# Patient Record
Sex: MALE | Race: WHITE
[De-identification: names, ages, dates, MRNs, and addresses within clinical notes are randomized per-mention and may not be internally consistent; named-entity substitution may affect disease eponyms.]

---

## 2019-02-16 ENCOUNTER — HOSPITAL ENCOUNTER (OUTPATIENT)
Dept: HOSPITAL 95 - ER | Age: 39
Setting detail: OBSERVATION
LOS: 2 days | Discharge: PSYCHIATRIC OR SUBSTANCE USE FACILITY | End: 2019-02-18
Payer: COMMERCIAL

## 2019-02-16 VITALS — BODY MASS INDEX: 29.62 KG/M2 | HEIGHT: 69 IN | WEIGHT: 200 LBS

## 2019-02-16 DIAGNOSIS — Z87.891: ICD-10-CM

## 2019-02-16 DIAGNOSIS — Z91.030: ICD-10-CM

## 2019-02-16 DIAGNOSIS — F32.9: ICD-10-CM

## 2019-02-16 DIAGNOSIS — T48.1X2A: Primary | ICD-10-CM

## 2019-02-16 DIAGNOSIS — Z79.899: ICD-10-CM

## 2019-02-16 DIAGNOSIS — Z88.1: ICD-10-CM

## 2019-02-16 LAB
ALBUMIN SERPL BCP-MCNC: 4 G/DL (ref 3.4–5)
ALBUMIN/GLOB SERPL: 1.2 {RATIO} (ref 0.8–1.8)
ALT SERPL W P-5'-P-CCNC: 37 U/L (ref 12–78)
ANION GAP SERPL CALCULATED.4IONS-SCNC: 10 MMOL/L (ref 6–16)
APAP SERPL-MCNC: <2 UG/ML (ref 10–30)
AST SERPL W P-5'-P-CCNC: 23 U/L (ref 12–37)
BASOPHILS # BLD AUTO: 0.05 K/MM3 (ref 0–0.23)
BASOPHILS NFR BLD AUTO: 1 % (ref 0–2)
BILIRUB SERPL-MCNC: 0.3 MG/DL (ref 0.1–1)
BUN SERPL-MCNC: 8 MG/DL (ref 8–24)
CALCIUM SERPL-MCNC: 8.1 MG/DL (ref 8.5–10.1)
CANNABINOIDS UR QL: DETECTED
CHLORIDE SERPL-SCNC: 110 MMOL/L (ref 98–108)
CO2 SERPL-SCNC: 22 MMOL/L (ref 21–32)
CREAT SERPL-MCNC: 0.66 MG/DL (ref 0.6–1.2)
DEPRECATED RDW RBC AUTO: 44 FL (ref 35.1–46.3)
EOSINOPHIL # BLD AUTO: 0.12 K/MM3 (ref 0–0.68)
EOSINOPHIL NFR BLD AUTO: 2 % (ref 0–6)
ERYTHROCYTE [DISTWIDTH] IN BLOOD BY AUTOMATED COUNT: 13.5 % (ref 11.7–14.2)
ETHANOL SERPL-MCNC: <3 MG/DL
GLOBULIN SER CALC-MCNC: 3.4 G/DL (ref 2.2–4)
GLUCOSE SERPL-MCNC: 86 MG/DL (ref 70–99)
HCT VFR BLD AUTO: 45.1 % (ref 37–53)
HGB BLD-MCNC: 15 G/DL (ref 13.5–17.5)
IMM GRANULOCYTES # BLD AUTO: 0.03 K/MM3 (ref 0–0.1)
IMM GRANULOCYTES NFR BLD AUTO: 1 % (ref 0–1)
LYMPHOCYTES # BLD AUTO: 0.88 K/MM3 (ref 0.84–5.2)
LYMPHOCYTES NFR BLD AUTO: 15 % (ref 21–46)
MCHC RBC AUTO-ENTMCNC: 33.3 G/DL (ref 31.5–36.5)
MCV RBC AUTO: 88 FL (ref 80–100)
MONOCYTES # BLD AUTO: 0.77 K/MM3 (ref 0.16–1.47)
MONOCYTES NFR BLD AUTO: 13 % (ref 4–13)
NEUTROPHILS # BLD AUTO: 4.1 K/MM3 (ref 1.96–9.15)
NEUTROPHILS NFR BLD AUTO: 69 % (ref 41–73)
NRBC # BLD AUTO: 0 K/MM3 (ref 0–0.02)
NRBC BLD AUTO-RTO: 0 /100 WBC (ref 0–0.2)
PLATELET # BLD AUTO: 256 K/MM3 (ref 150–400)
POTASSIUM SERPL-SCNC: 3.5 MMOL/L (ref 3.5–5.5)
PROT SERPL-MCNC: 7.4 G/DL (ref 6.4–8.2)
SALICYLATES SERPL-MCNC: <1.7 MG/DL (ref 2.8–20)
SODIUM SERPL-SCNC: 142 MMOL/L (ref 136–145)
SP GR SPEC: 1.01 (ref 1–1.02)
TSH SERPL DL<=0.005 MIU/L-ACNC: 0.75 UIU/ML (ref 0.36–4.8)
UROBILINOGEN UR STRIP-MCNC: (no result) MG/DL

## 2019-02-16 PROCEDURE — G0480 DRUG TEST DEF 1-7 CLASSES: HCPCS

## 2019-02-16 PROCEDURE — G0378 HOSPITAL OBSERVATION PER HR: HCPCS

## 2019-04-11 ENCOUNTER — HOSPITAL ENCOUNTER (INPATIENT)
Dept: HOSPITAL 95 - ER | Age: 39
LOS: 4 days | Discharge: PSYCHIATRIC OR SUBSTANCE USE FACILITY | DRG: 917 | End: 2019-04-15
Attending: FAMILY MEDICINE | Admitting: FAMILY MEDICINE
Payer: COMMERCIAL

## 2019-04-11 VITALS — BODY MASS INDEX: 27 KG/M2 | HEIGHT: 69.02 IN | WEIGHT: 182.32 LBS

## 2019-04-11 DIAGNOSIS — J96.90: ICD-10-CM

## 2019-04-11 DIAGNOSIS — T43.212A: ICD-10-CM

## 2019-04-11 DIAGNOSIS — T39.1X2A: Primary | ICD-10-CM

## 2019-04-11 DIAGNOSIS — T42.4X2A: ICD-10-CM

## 2019-04-11 DIAGNOSIS — T48.1X2A: ICD-10-CM
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
## 2019-04-11 DIAGNOSIS — Z89.202: ICD-10-CM

## 2019-04-11 DIAGNOSIS — T42.8X2A: ICD-10-CM

## 2019-04-11 DIAGNOSIS — R40.20: ICD-10-CM

## 2019-04-11 DIAGNOSIS — F17.210: ICD-10-CM

## 2019-04-11 DIAGNOSIS — F32.3: ICD-10-CM

## 2019-04-11 LAB
ALBUMIN SERPL BCP-MCNC: 3.3 G/DL
ALBUMIN/GLOB SERPL: 1.1 {RATIO}
ALT SERPL W P-5'-P-CCNC: 23 U/L
ANION GAP SERPL CALCULATED.4IONS-SCNC: 6 MMOL/L
APAP SERPL-MCNC: 11 UG/ML
AST SERPL W P-5'-P-CCNC: 12 U/L
BASOPHILS # BLD AUTO: 0.04 K/MM3
BASOPHILS NFR BLD AUTO: 1 %
BENZODIAZ UR-MCNC: DETECTED UG/L
BILIRUB SERPL-MCNC: 0.2 MG/DL
BUN SERPL-MCNC: 7 MG/DL
CALCIUM SERPL-MCNC: 7.8 MG/DL
CANNABINOIDS UR QL: DETECTED
CHLORIDE SERPL-SCNC: 109 MMOL/L
CO2 SERPL-SCNC: 26 MMOL/L
CREAT SERPL-MCNC: 0.71 MG/DL
DEPRECATED RDW RBC AUTO: 41.6 FL
EOSINOPHIL # BLD AUTO: 0.31 K/MM3
EOSINOPHIL NFR BLD AUTO: 4 %
ERYTHROCYTE [DISTWIDTH] IN BLOOD BY AUTOMATED COUNT: 13.3 %
ETHANOL SERPL-MCNC: <3 MG/DL
GLOBULIN SER CALC-MCNC: 3 G/DL
GLUCOSE SERPL-MCNC: 110 MG/DL
HCT VFR BLD AUTO: 39.5 %
HGB BLD-MCNC: 13.5 G/DL
IMM GRANULOCYTES # BLD AUTO: 0.02 K/MM3
IMM GRANULOCYTES NFR BLD AUTO: 0 %
LYMPHOCYTES # BLD AUTO: 1.68 K/MM3
LYMPHOCYTES NFR BLD AUTO: 22 %
MCHC RBC AUTO-ENTMCNC: 34.2 G/DL
MCV RBC AUTO: 86 FL
MONOCYTES # BLD AUTO: 0.63 K/MM3
MONOCYTES NFR BLD AUTO: 8 %
NEUTROPHILS # BLD AUTO: 4.87 K/MM3
NEUTROPHILS NFR BLD AUTO: 65 %
NRBC # BLD AUTO: 0 K/MM3
NRBC BLD AUTO-RTO: 0 /100 WBC
OPIATES UR-MCNC: DETECTED NG/ML
PH BLDA: 7.39 [PH]
PH BLDA: 7.42 [PH]
PLATELET # BLD AUTO: 202 K/MM3
POTASSIUM SERPL-SCNC: 3.1 MMOL/L
PROT SERPL-MCNC: 6.3 G/DL
PROTHROMBIN TIME: 10.8 SEC
SALICYLATES SERPL-MCNC: 3.7 MG/DL
SODIUM SERPL-SCNC: 141 MMOL/L
SP GR SPEC: 1.01
TSH SERPL DL<=0.005 MIU/L-ACNC: 3.29 UIU/ML
UROBILINOGEN UR STRIP-MCNC: (no result) MG/DL

## 2019-04-11 PROCEDURE — 5A1945Z RESPIRATORY VENTILATION, 24-96 CONSECUTIVE HOURS: ICD-10-PCS | Performed by: FAMILY MEDICINE

## 2019-04-11 PROCEDURE — G0480 DRUG TEST DEF 1-7 CLASSES: HCPCS

## 2019-04-11 PROCEDURE — 0BH17EZ INSERTION OF ENDOTRACHEAL AIRWAY INTO TRACHEA, VIA NATURAL OR ARTIFICIAL OPENING: ICD-10-PCS | Performed by: FAMILY MEDICINE

## 2019-04-11 PROCEDURE — C9113 INJ PANTOPRAZOLE SODIUM, VIA: HCPCS

## 2019-04-11 NOTE — NUR
FAMILY AND DR ROSARIO AT BEDSIDE.
EXPLAINED RESTRAINTS TO FAMILY. AWAITING ORDERS FOR SEDATION ADJUNCT. PROPOFOL
UP TO 50 MCQ/KG/MIN.

## 2019-04-11 NOTE — NUR
POISON CONTROL
RECEIVED CALL FROM POISON CONTROL. REVIEWED LABS AND MEDICATIONS. RECEIVED
ORDERS FOR LFT AND ASA LEVEL WITH NEXT LABS.

## 2019-04-11 NOTE — NUR
ASSESSMENT
PT ADMITTED ICU 11 VIA ER. PT ARRIVED VIA GURNEY VENTED WITH RT AND ER NURSE.
PT SEDATED ON PROPOFOL AT 45 MCQ/KG/MIN. PT TRANSFERED TO BED BY STAFF WITH
LIFT SHEET. SLIGHT POSTERING NOTED TO BILAT LOWER EXT(FEET POINTING DOWN) AND
BILAT UPPER EXT (ARMS STIFF AND INWARDLY ROTATED). PUPILS 4 CM SL[address] DR FFOANA AT BEDSIDE ASSESSING PT. PT WITHDRAWALS FROM PAINFUL STIMULI. NOT
FOLLOWING INSTRUCTIONS. LUNGS CLEAR ON NECH VENT. VENT SETTINGS AC 14 
PEEP 5 FIO2 35%. HEART RATE REGULAR AND BP STABLE. NO EDEMA. OG CLAMPED. BT+
ABD FLAT AND SOFT. POLLARD CATH PATENT DRAINING CLEAR YELLOW URINE. IV 18G TO
RIGHT WRIST FIELD START SALINE LOCKED, SITE CLEAR AND FLUSHES WITHOUT
DIFFICULTY. IV 18G TO LEFT AC WITH PROPOFOL INFUSING AT 45 MCQ/KG/MIN, SITE
CLEAR. PT PLACED IN BILAT SOFT WRIST RESTRAINTS TO PREVENT SELF EXTUBATION.

## 2019-04-12 LAB
ALBUMIN SERPL BCP-MCNC: 2.8 G/DL
ALBUMIN SERPL BCP-MCNC: 3 G/DL
ALBUMIN/GLOB SERPL: 1 {RATIO}
ALBUMIN/GLOB SERPL: 1.1 {RATIO}
ALT SERPL W P-5'-P-CCNC: 17 U/L
ALT SERPL W P-5'-P-CCNC: 25 U/L
ANION GAP SERPL CALCULATED.4IONS-SCNC: 11 MMOL/L
APAP SERPL-MCNC: 4 UG/ML
APAP SERPL-MCNC: <2 UG/ML
AST SERPL W P-5'-P-CCNC: 3 U/L
AST SERPL W P-5'-P-CCNC: 5 U/L
BASOPHILS # BLD AUTO: 0.02 K/MM3
BASOPHILS NFR BLD AUTO: 0 %
BILIRUB DIRECT SERPL-MCNC: 0.1 MG/DL
BILIRUB DIRECT SERPL-MCNC: <0.1 MG/DL
BILIRUB INDIRECT SERPL-MCNC: 0.2 MG/DL
BILIRUB SERPL-MCNC: 0.2 MG/DL
BILIRUB SERPL-MCNC: 0.3 MG/DL
BUN SERPL-MCNC: 6 MG/DL
CALCIUM SERPL-MCNC: 7.6 MG/DL
CHLORIDE SERPL-SCNC: 114 MMOL/L
CO2 SERPL-SCNC: 24 MMOL/L
CREAT SERPL-MCNC: 0.63 MG/DL
DEPRECATED RDW RBC AUTO: 41.3 FL
EOSINOPHIL # BLD AUTO: 0.29 K/MM3
EOSINOPHIL NFR BLD AUTO: 4 %
ERYTHROCYTE [DISTWIDTH] IN BLOOD BY AUTOMATED COUNT: 13.3 %
GLOBULIN SER CALC-MCNC: 2.5 G/DL
GLOBULIN SER CALC-MCNC: 2.9 G/DL
GLUCOSE SERPL-MCNC: 96 MG/DL
HCT VFR BLD AUTO: 35.8 %
HGB BLD-MCNC: 12.3 G/DL
IMM GRANULOCYTES # BLD AUTO: 0.04 K/MM3
IMM GRANULOCYTES NFR BLD AUTO: 1 %
LYMPHOCYTES # BLD AUTO: 1.6 K/MM3
LYMPHOCYTES NFR BLD AUTO: 22 %
MAGNESIUM SERPL-MCNC: 2.1 MG/DL
MCHC RBC AUTO-ENTMCNC: 34.4 G/DL
MCV RBC AUTO: 85 FL
MONOCYTES # BLD AUTO: 0.82 K/MM3
MONOCYTES NFR BLD AUTO: 11 %
NEUTROPHILS # BLD AUTO: 4.51 K/MM3
NEUTROPHILS NFR BLD AUTO: 62 %
NRBC # BLD AUTO: 0 K/MM3
NRBC BLD AUTO-RTO: 0 /100 WBC
PLATELET # BLD AUTO: 183 K/MM3
POTASSIUM SERPL-SCNC: 3.7 MMOL/L
PROT SERPL-MCNC: 5.3 G/DL
PROT SERPL-MCNC: 5.9 G/DL
PROTHROMBIN TIME: 10.9 SEC
PROTHROMBIN TIME: 11.7 SEC
SALICYLATES SERPL-MCNC: 2.3 MG/DL
SODIUM SERPL-SCNC: 149 MMOL/L

## 2019-04-12 NOTE — NUR
ASSUMED CARE OF PT, BEDSIDE REPORT RECEIVED. PT IS ALERT AND ORIENTED SPEAKING
IN FULL SENTANCES, EXPRESSES CONCERN REGARDING BELONGINGS AT HOME AND REQUESTS
PHONE FOR COMMUNICATION WITH HIS FATHER. DISCUSSED WITH DAY SHIFT RN AND
AGREED TO ALLOW PT 10 MINUTES OF PHONE TIME. PT IS AGREEABLE AND VERBALIZES
UNDERSTANDING.

## 2019-04-12 NOTE — NUR
SHIFT SUMMARY
 
PT EXTUBATED AT 1000 THIS AM. SINCE THEN, HE HAS SLEPT ON/OFF DURING SHIFT.
FATHER AND FRIEND VISITED IN AFTERNOON. TELEPYSCH CONFERENCE COMPLETED. PT
CALM AND COOPERATIVE ENTIRE SHIFT. ELEVATED HR THIS AFTERNOON; SCHEDULED
METOPROLOL STARTED THIS AFTERNOON. HR . BP STABLE. LR INFUSING AT
75 ML/HR PER ORDER. WILL CONTINUE TO MONITOR.

## 2019-04-12 NOTE — NUR
REASSESSMENT
 
PT HAS BEEN MOSTLY SLEEPING SINCE EXTUBATION. 2 MD HOLD INITIATED AND PT AWARE
OF RIGHTS. VSS. NSR, . HAD EXTENSIVE CONVERSATION WITH PT ABOUT THE
IMPORTANCE OF COOPERATING WITH STAFF, AND LEAVING LINES AND TUBES ALONE. PT
AGREES AND STATES HE DOES NOT WANT TO GO TO LOCKED ROOM IN ED. HE IS CALM AT
THIS TIME. CALL LIGHT WITHIN REACH. ACETYLCYSTEINE REMAINS INFUSING PER ORDER.
LR INFUSING  ML/HR PER ORDER. WILL CONTINUE TO MONITOR.

## 2019-04-12 NOTE — NUR
HYPOTENSION
BP DOWN TO 81/59 HEART RATE 82, 250 ML OF LR STARTED. PROPOFOL DECREASED TO 35
MCQ/KG/MIN. CALL TO DR FOFANA. RECEIVED ORDERS TO GIVE 1 LITER BOLUS OF NS,
INCREASE LR  ML/HR AND START AZALIA SYNEPHRINE TO KEEP MAP ABOVE 60.
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
## 2019-04-12 NOTE — NUR
EXTUBATION
 
PROPOFOL GTT TURNED OFF AT 0930 AND VENT CHANGED TO PS 8/5. PT EXTUBATED AT
1005 AND OGT REMOVED. SINCE THEN, PT HAS TOLERATED DRINKING WATER. WILL
ATTEMPT TO MOBILIZE PT AND REMOVED CATHETER. WILL CONTINUE TO MONITOR.

## 2019-04-12 NOTE — NUR
CARE ASSUMED
 
CARE AND REPORT ASSUMED FROM COLEMAN RAMESH. PT INTUBATED AND SEDATED. VENT AC 14,
, PEEP 5, FIO2 25%. LUNG SOUNDS CLEAR. AT START OF SHIFT, PROPOFOL GTT
AT 25 MCG. PT SUDDENLY BECAME RESTLESS, AGITATED, PULLING AGAINST RESTRAINTS
SO HARD HE WAS ABLE TO PULL ARM OUT OF RESTRAINT AND ATTEMPTED TO REACH UP TO
FACE. ARM RESTRAINED AGAIN; RESTRAINTS CHECKED, VERSED 2MG IVP GIVEN, AND
PROPOFOL GTT INCREASED. WILL CONTINUE TO TITRATE PROPOFOL TO MAINTAIN SEDATION
AND STABLE BP. AFEBRILE. NSR, HR 90S. POLLARD CATH SECURED AND PATENT. WILL
ATTEMPT FULL SEDATION VACATION WHEN PT CALMED DOWN. MD ROSARIO BEDSIDE TO
EVALUATE PT THIS AM. ACETYLCYSTEINE INFUSING PER ORDER. LR INFUSING 
ML/HR PER ORDER. WILL CONTINUE TO MONITOR CLOSELY.

## 2019-04-12 NOTE — NUR
PHONE REMOVED PER CHARGE RN, EXPLAINED 2 MD HOLD POLICIES TO PT, PT AGREEABLE.
HS ASSESSMENT, PT DENIES N/V, STATES THAT HE IS HUNGRY AND INQUIRES REGARDING
DINNER, DOES REMEMBER ROAST BEEF AT MEALTIME, REQUESTS PUDDING AND HALF
SANDWICH. PT IS SPEAKING IN FULL SENTANCES, DENIES N/V, DENIES CP/PRESSURE,
DENIES SOB/DYSPNEA, DOES STATE THAT HE CAN FEEL A RAPID HEART RATE AND
INQUIRES REGARDING CURRENT RATE. LUNGS ARE CLEAR THROUGHOUT, SATS HIGH 90S ON
ROOM AIR, NO INCREASED WORK OF BREATHING IS NOTED AT THIS TIME, RATE WNL. HRR,
SINUS TACH ON MONITOR, RATE 130S, SYSTOLIC PRESSURE 110S MAP MAINTAINING,
PULSES FULL X 4 EXTREMITIES, NO EDEMA IS NOTED, BRISK CAP REFILL. NORMOACTIVE
BOWEL TONES NOTED AT THIS TIME, ABD SOFT, NONTENDER TO PALPATION. POLLARD CATH
REMAINS IN PLACE DRAINING CLEAR YELLOW URINE TO GRAVITY. LR INFUSING AT 75
ML/HR TO RIGHT FOREARM IV, SITE WNL.

## 2019-04-12 NOTE — NUR
POISON CONTROL
RECEIVED A CALL FROM POISON CONTROL. REVIEWED LABS AND EKG. TYLENOL LEVEL,LFT
AND INR TO BE DRAWN 2HRS BEFORE NAC IS FINISHED AT 1500.

## 2019-04-12 NOTE — NUR
REASSESSMENT
 
PT CURRENTLY IN TELEPSYCH CONFERENCE. PT IS CALM AND COOPERATIVE AT THIS TIME,
SLEEPING INTERMITTENTLY. STATES HE IS WEAK AND HAS DIFFICULTY WITH FINE MOTOR
SKILLS. SINUSTACH, -140; MD ELENA MADE AWARE AND METOPROLOL 25 MG PO
GIVEN. SPO2 96% RA. STATES HIS THROAT IS SORE. PT BEING MONITORED BY CAMERA.
LR MIV CHANGED TO 75 ML/HR PER ORDER. ACETYLCYSTEINE INFUSION ALMOST
COMPLETED. WILL CONTINUE TO MONITOR.

## 2019-04-12 NOTE — NUR
REASSESSMENT
PT OPENED MOUTH WHEN ASKED FOR MOUTH CARE AND OPENED ONE EYE SLIGHTLY. THAN
BECAME AGITATED AND STARTED TO PULL ON RESTRAINTS. PT REPOSITIONED AND REACHED
FOR ET TUBE. BILAT WRIST RESTRAINTS ON. PROPOFOL INCREASED TO 25 MCQ/KG/MIN.

## 2019-04-12 NOTE — NUR
REASSESSMENT
PT CONT INTUBATED AND ON MECH VENT. MED WITH VERSED FOR MOUTH CARE AND
TURNING. PT NOT FOLLOWING INSTURCTIONS. LUNGS CLEAR. ORAL CARE DONE AND PT
REPOSITIONED. OG TO LIS.

## 2019-04-13 LAB
ALBUMIN SERPL BCP-MCNC: 3.1 G/DL
ALBUMIN/GLOB SERPL: 1 {RATIO}
ALT SERPL W P-5'-P-CCNC: 18 U/L
ANION GAP SERPL CALCULATED.4IONS-SCNC: 6 MMOL/L
ANION GAP SERPL CALCULATED.4IONS-SCNC: 8 MMOL/L
AST SERPL W P-5'-P-CCNC: 13 U/L
BASOPHILS # BLD AUTO: 0.03 K/MM3
BASOPHILS NFR BLD AUTO: 0 %
BILIRUB SERPL-MCNC: 0.4 MG/DL
BUN SERPL-MCNC: 6 MG/DL
BUN SERPL-MCNC: 7 MG/DL
CALCIUM SERPL-MCNC: 8.3 MG/DL
CALCIUM SERPL-MCNC: 8.3 MG/DL
CHLORIDE SERPL-SCNC: 112 MMOL/L
CHLORIDE SERPL-SCNC: 113 MMOL/L
CO2 SERPL-SCNC: 26 MMOL/L
CO2 SERPL-SCNC: 27 MMOL/L
CREAT SERPL-MCNC: 0.84 MG/DL
CREAT SERPL-MCNC: 0.86 MG/DL
DEPRECATED RDW RBC AUTO: 42.2 FL
EOSINOPHIL # BLD AUTO: 0.2 K/MM3
EOSINOPHIL NFR BLD AUTO: 2 %
ERYTHROCYTE [DISTWIDTH] IN BLOOD BY AUTOMATED COUNT: 13.5 %
GLOBULIN SER CALC-MCNC: 3.1 G/DL
GLUCOSE SERPL-MCNC: 88 MG/DL
GLUCOSE SERPL-MCNC: 94 MG/DL
HCT VFR BLD AUTO: 37.6 %
HGB BLD-MCNC: 12.7 G/DL
IMM GRANULOCYTES # BLD AUTO: 0.03 K/MM3
IMM GRANULOCYTES NFR BLD AUTO: 0 %
LYMPHOCYTES # BLD AUTO: 1.25 K/MM3
LYMPHOCYTES NFR BLD AUTO: 13 %
MAGNESIUM SERPL-MCNC: 2 MG/DL
MCHC RBC AUTO-ENTMCNC: 33.8 G/DL
MCV RBC AUTO: 86 FL
MONOCYTES # BLD AUTO: 1.17 K/MM3
MONOCYTES NFR BLD AUTO: 12 %
NEUTROPHILS # BLD AUTO: 7.34 K/MM3
NEUTROPHILS NFR BLD AUTO: 73 %
NRBC # BLD AUTO: 0 K/MM3
NRBC BLD AUTO-RTO: 0 /100 WBC
PHOSPHATE SERPL-MCNC: 5 MG/DL
PLATELET # BLD AUTO: 207 K/MM3
POTASSIUM SERPL-SCNC: 3.7 MMOL/L
POTASSIUM SERPL-SCNC: 3.8 MMOL/L
PROT SERPL-MCNC: 6.2 G/DL
SODIUM SERPL-SCNC: 146 MMOL/L
SODIUM SERPL-SCNC: 146 MMOL/L

## 2019-04-13 NOTE — NUR
SHIFT SUMMARY
 
PATIENT IS RESTING QUIETLY IN BED. VSS. PATIENT HAD SOME PERIODS OF AGITATION
DURING SHIFT. PATIENT WAS STARTED ON PRN ATIVAN AND STATES HIS ANXIETY IS
BETTER. PATIENT WAS STARTED ON A NICORETTE GUM PRN. PATIENT IS AFEBRILE.
PATIENT LUNG SOUNDS ARE CLEAR T/O. PATIENT IS SATTING AT OR ABOVE 90% ON RA.
HR IS IN THE 80S-90S NOW. BP IS STABLE. GI//SKIN IS WNL. PATIENT IS A FALL
RISK DUE TO DROP FOOT AND REMAINS ON A 2 MD HOLD. PATIENT HAS NO OTHER
COMPLAINTS AT THIS TIME. BED IS LOW. CALL LIGHT IS IN REACH.

## 2019-04-13 NOTE — NUR
PATIENT BACK TO UNIT TO "GET HIS BELONGINGS".  SECURITY AND PATIENT'S FATHER
FOLLOWING BEHIND.  NURSE TOOK BELONGINGS TO ANOTHER ROOM.  PATIENT WALKED BACK
INTO ROOM.  SECURITY AND FATHER OUTSIDE ROOM.  PATIENT YELLING AT NURSES,
SECURITY, AND FATHER.

## 2019-04-13 NOTE — NUR
INITIAL ASSESSMENT
 
PATIENT IS RESTING IN BED AND IS ALERT AND ORIENTED. PATIENT IS AFEBRILE.
DR RAMIREZ AND CARE MANAGEMENT IN TO SEE PATIENT THIS AM. PATIENT EXPRESSED SOME
ANXIETY REGARDING CONVERSATION WITH CARE MANAGEMENT ABOUT INPATIENT PSYCH
FACILITY AND STATES HE "DOES NOT WANT TO GO TO GROUP THERAPY". PATIENT LUNG
SOUNDS ARE CLEAR T/O. PATIENT REMAINS SATTING 90% OR GREATER ON RA. HR IS IN
THE HIGH 90S TO LOW 100S. BP IS STABLE. PATIENT HAS SOME MILD DISTENTION IN
ABDOMEN AND HYPOACTIVE BOWEL TONES.  IS WNL. SKIN IS WNL. IV'S WERE
DISCONTINUED PER DR REQUEST THIS AM. BED LOW. CALL LIGHT IN REACH. WILL
CONTINUE TO MONITOR.

## 2019-04-13 NOTE — NUR
CALLED AND SPOKE TO DR. RAMIREZ AS PATIENT CONTINUES TO ASK FOR PSYCH/ DEPRESSION
MEDS AND STATES THAT HE "THINKS HE IS GOING THROUGH WITHDRAWALS".  PATIENT
AGREED TO TAKE SOMETHING ORALLY TO HELP WITH ANXIETY/ AGITATION.  FATHER IN
ROOM AT BEDSIDE TALKING WITH PATIENT.  ORDERS RECEIVED.

## 2019-04-13 NOTE — NUR
PATIENT AGITATED AND UPSET AFTER FINDING OUT THAT HE WILL BE GOING TO AN
INPATIENT PSYCH CENTER.  PATIENT TOSSED WALKED OVER.  FATHER REMOVED TABLE AND
WALKER.  PATIENT BACK IN BED BUT REMAINS MAD AND CONTINUES TO STATE OVER AND
OVER THAT HE WILL NOT GO TO AN INPATIENT PSYCH CENTER.

## 2019-04-13 NOTE — NUR
ASSUMED CARE OF PT
 
PT IS RESTING QUIETLY RECLINING IN BED WATCHING TV. EXPRESSES DISLIKE OF
POLICY REGARDING KEEPING CURTAINS OPEN UNLESS STAFF ARE IN THE ROOM. STATES
THAT HE DOES NOT WANT SOMEONE STANDING IN THE ROOM WHILE HE USES THE TOILET.
EXPLAINED THAT POLICY WAS IMPLEMENTED FOR HIS SAFETY SECONDARY TO HIM BEING A
HIGH RISK FOR SUICIDE. HE IS CONVERSATIONAL AND VERBALIZES UNDERSTANDING. HE
EXPLAINS SOME OF THE SAFETY PRECAUTIONS AT [facility] THAT ALLOW FOR PATIENT
PRIVACY WHILE MAINTAINING SAFETY SUCH AS PRESSURE SENSORS ALONG THE TOPS OF
DOORS AND KNOBS THAT DON'T PROVIDE ANCHOR POINTS. HE ALSO EXPRESSES THAT HE
WANTS TO GO OUTSIDE AND HAVE A CIGARETTE AND DOES NOT APPROVE OF POLICY THAT
DOES NOT ALLOW FOR HIM TO GO OUT TO SMOKING AREA AS A 2 MD HOLD. HE AGAIN
STATES THAT HE WANTS HIS PHONE. HE DOES VERBALIZE UNDERSTANDING OF REASONING
BEHIND POLICIES THAT PREVENT THE THINGS HE WANTS WHILE MAINTAINING THAT HE IS
NOT A DANGER TO HIMSELF AT THIS TIME, HE STATES THAT HE IS NOT IN CRISIS
ANYMORE. WILL MONITOR.

## 2019-04-13 NOTE — NUR
PATIENT AGITATION HAS INCREASED AGAIN BECAUSE HE CANNOT HAVE HIS BELONGINGS
WHILE ON 2 MD HOLD.  SECURITY CALLED BECAUSE PATIENT THREATENING STAFF, "IF
TRY TO GET HIM INTO INPATIENT [facility] FACILITY".  SECURITY ARRIVES.  PATIENT
LEAVES ROOM AND UNIT.  911 CALLED AND INFORMED OF PATIENT LEAVING.

## 2019-04-13 NOTE — NUR
PATIENT RESTING IN BED QUIETLY.  NO COMPLAINTS.  PATIENT ANXIOUS BUT
COOPERATIVE.  PATIENT GIVEN PRN ATIVAN.  FATHER AT BEDSIDE SPEAKING WITH
PATIENT.  VSS.  NO OTHER ACUTE CHANGES TO NOTE ON AT THIS TIME.  WILL CONTINUE
TO MONITOR.

## 2019-04-13 NOTE — NUR
POISON CONTROL CALLED TO CHECK UP ON PATIENT.  THEY HAVE NO RECOMMENDATIONS AT
THIS TIME AND STATE THEY ARE SIGNING OFF AT THIS TIME.

## 2019-04-13 NOTE — NUR
PT RESTS QUIETLY THROUGHOUT SHIFT, INITIALLY WITHDRAWN WITH A FLAT AFFECT.
THIS AM HE IS NOTED TO SMILE OCCASIONALLY AND LAUGH. HE IS CONVERSATIONAL WITH
PARAMEDIC STUDENT REGARDING ANATOMY AND PHYSIOLOGY OF PULMONARY CIRCULATION.
DISCUSSES HX OF WORKING WITH [employer] IN [place] SUCH AS ADMINISTRATION OF
MEDICATIONS INTRAPERTIONEAL IF IV ACCESS WAS UNABLE TO BE OBTAINED. OTHERWISE
NO ACUTE CHANGES THIS SHIFT.

## 2019-04-13 NOTE — NUR
DR. RAMIREZ IN TO SEE PATIENT.  CARE MANAGEMENT INFORMED THAT TELEPSYCH RECOMMENDS
INPATIENT TREATMENT.  CARE MANAGEMENT STATES THEY WILL BE IN TO SEE PATIENT
SOON.

## 2019-04-13 NOTE — NUR
PATIENT IS RESTING IN BED. VSS. SPEAKING WITH FATHER WHO IS AT THE BEDSIDE. NO
COMPLAINTS OF PAIN AT THIS TIME. WILL CONTINUE TO MONITOR.

## 2019-04-14 NOTE — NUR
INITIAL ASSESSMENT
 
PATIENT IS RESTING QUIETLY IN BED WATCHING TELEVISION. PATIENT IS AFEBRILE.
PATIENT WAS GIVEN NICORETTE GUM FOR COMPLAINT OF NICOTINE CRAVING. PATIENT HAS
A HISTORY OF CHRONIC BACK PAIN, BUT STATES PAIN IS TOLERABLE AT THIS TIME.
PATIENT IS SATTING 90% OR GREATER ON RA. HR IS IN THE 90S. NSR. BP IS STABLE.
PATIENT HAS HYPOACTIVE BOWEL TONES.  AND SKIN IS WNL. PATIENT IS INDEPENDENT
IN ROOM. BED LOW. CALL LIGHT IN REACH. WILL CONTINUE TO MONITOR.

## 2019-04-14 NOTE — NUR
PATIENT RESTING QUIETLY IN BED. PRN ATIVAN GIVEN FOR COMPLAINT OF INCREASED
ANXIETY. NO COMPLAINTS OF PAIN AT THIS TIME. NO ACUTE CHANGES TO NOTE ON. WILL
CONTINUE TO MONITOR.

## 2019-04-14 NOTE — NUR
SHIFT SUMMARY
 
PATIENT IS RESTING IN BED QUIETLY WATCHING TELEVISION. VSS. PATIENT HAS
EXPRESSED THAT HE WOULD LIKE TO BE PLACED BACK ON HIS "PSYCH MEDS" AND HAS
SHOWN SOME AGITATION THROUGHOUT THE SHIFT, HOWEVER HAS BEEN MOSTLY CALM AND
COOPERATIVE TODAY. PATIENT FATHER BROUGHT DAUGHTER IN TO VISIT FOR SHORT TIME
TODAY. PATIENT WAS APPRECIATIVE OF THIS AND MAINTAINED LIMITS THAT WERE SET.
PATIENT IS AFEBRILE. PATIENT HAS TAKEN PRN ATIVAN FOR ANXIETY AND PRN
NICORETTE GUM FOR CIGARETTE CRAVINGS THROUGHOUT THE SHIFT TODAY. PATIENT
REMAINS SATTING 90% OR GREATER ON RA WITH CLEAR LUNG SOUNDS. NSR W HR IN THE
90S-LOW 100S. BP IS STABLE. GI//SKIN ARE ALL WNL. NO ACUTE CHANGES TO NOTE
AT THIS TIME. BED LOW, CALL LIGHT WITHIN REACH.

## 2019-04-14 NOTE — NUR
PT UP TO SHOWER EARLY THIS SHIFT WITH FWW, TOLERATED WELL. CONTINUES
CONVERSATIONAL THIS AM ALTHOUGH DOES CONTINUE TO VERBALIZE FRUSTRATION WITH
POLICIES PREVENTING HIM FROM AMBULATING OUTSIDE TO SMOKE AS WELL AS POLICIES
PREVENTING PULLING ROOM CURTAIN CLOSED WITHOUT STAFF IN ROOM. ASSESSMENT IS
OTHERWISE UNCHANGED THROUGHOUT SHIFT.
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
## 2019-04-14 NOTE — NUR
PATIENT'S FATHER HERE WITH PATIENT'S DAUGHTER FOR 30 MINUTES.  FATHER ALSO SET
LIMITATIONS WITH PATIENT.

## 2019-04-14 NOTE — NUR
PATIENT ASKED SEVERAL TIMES IN AM TO SPEAK WITH HIS DAD BECAUSE HE WANTS TO
SEE HIS DAUGHTER.  NURSE FINALLY HAD TIME TO SPEAK WITH CHARGE NURSE, MAGID CHURCH, AND PATIENT'S FATHER, JANNETH.  CHARGE NURSE OKAY WITH FATHER COMING IN AND
BRINGING PATIENT'S DAUGHTER AS LONG AS FATHER IS IN ROOM WITH PATIENT THE
ENTIRE TIME AND VISIT LIMITED TO 30 MINUTES, SINCE PATIENT WILL BE TRANSFERRED
TO INPATIENT FACILITY.  PATIENT'S FATHER STATES THAT TODAY WOULD BE BETTER
THAN TOMORROW AND STATES THAT HE WILL SPEAK TO PATIENT'S WIFE.  PATIENT
NOTIFIED THAT [facility] DENIED HIM, BUT THAT HE MAY POSSIBLY HAVE A BED
TOMORROW AT [facility].  PATIENT ALSO INFORMED THAT NURSE SPOKE WITH FATHER.
NURSE INFORMED THAT VISIT MAY OR MAY NOT HAPPEN AND LIMITS SET IF VISIT DOES
HAPPEN.  PATIENT AGREES WITH LIMITS AND STATES HE WILL REMAIN COOOPERATIVE.

## 2019-04-15 NOTE — NUR
BELONGINGS- CLOTHING IN A RED SUITCASE SENT WITH PT. ALSO CELL PHONE AND
 IN SUITCASE. KNIFE, KONRAD, AND CANE SENT HOME WITH DAD.

## 2019-04-15 NOTE — NUR
SUMMARY
PATIENT SLEEPING OFF AND ON T/O NIGHT. AWAKENS EASILY TO VERBAL STIMULI. NO
CHANGES T/O NIGHT.

## 2019-04-15 NOTE — NUR
PATIENT APPEARS TO BE SLEEPING, RESP EVEN AND UNLABORED. VITAL SIGNS HELD AT
THIS TIME TO ALLOW PATIENT TO SLEEP. REMOTE MONITORING CONTINUES AS WELL AS
FREQUENT VISUAL CHECKING ON PATIENT.

## 2019-04-15 NOTE — NUR
PT HAS BEEN ACCEPTED AT [facility] AND WILL DC AROUND 1630. PT HAS BEEN
AWARE THAT MAY HAPPEN AND HAS TRIED TO MAKE CALLS TO HIS DR'S TO HAVE IT
CANCELLED. PT ANGRY THAT HE IS UNABLE TO MAKE ANYONE LISTEN TO HIM. HAS
REPEATEDLY ASKED FOR HIS PHONE AND TO BE ABLE TO GO OUT AND SMOKE. TRANSPORT
WILL BE HERE AT 1630 BOBBI AND SECURITY TO BE ON STANDBY.

## 2019-04-15 NOTE — NUR
DISCHARGE- PT D/C'D VIA [facility] TRANSPORT TO [facility]. SECURITY AND CARE
MANAGEMENT ESCORTING PT TO [facility].

## 2019-04-15 NOTE — NUR
ASSESS- Pt. awake a/o x 3. pt. cooperative,calm. Sts. has usual n/t in left
leg. Chronic back pain is at a 3 pain level which is his normal.